# Patient Record
Sex: FEMALE | Race: WHITE | NOT HISPANIC OR LATINO | Employment: UNEMPLOYED | URBAN - METROPOLITAN AREA
[De-identification: names, ages, dates, MRNs, and addresses within clinical notes are randomized per-mention and may not be internally consistent; named-entity substitution may affect disease eponyms.]

---

## 2020-05-29 PROBLEM — S51.819A LACERATION OF FOREARM: Status: ACTIVE | Noted: 2020-05-29

## 2020-05-29 PROBLEM — Z72.0 TOBACCO ABUSE: Status: ACTIVE | Noted: 2020-05-29

## 2020-05-29 PROBLEM — Z87.440 HX: UTI (URINARY TRACT INFECTION): Status: ACTIVE | Noted: 2020-05-29

## 2020-05-29 PROBLEM — F41.1 GENERALIZED ANXIETY DISORDER: Status: ACTIVE | Noted: 2020-05-29

## 2020-09-24 PROBLEM — H69.90 DYSFUNCTION OF EUSTACHIAN TUBE: Status: ACTIVE | Noted: 2020-09-24

## 2020-09-24 PROBLEM — T78.40XA ALLERGIES: Status: ACTIVE | Noted: 2020-09-24

## 2021-04-12 PROBLEM — E78.00 PURE HYPERCHOLESTEROLEMIA: Status: ACTIVE | Noted: 2021-04-12

## 2021-07-26 PROBLEM — R00.1 SYMPTOMATIC BRADYCARDIA: Status: ACTIVE | Noted: 2021-07-26

## 2021-07-26 PROBLEM — Z00.00 PREVENTATIVE HEALTH CARE: Status: ACTIVE | Noted: 2021-07-26

## 2021-07-26 PROBLEM — H04.123 BILATERAL DRY EYES: Status: ACTIVE | Noted: 2021-07-26

## 2021-07-26 PROBLEM — R23.2 HOT FLASHES: Status: ACTIVE | Noted: 2021-07-26

## 2021-07-26 PROBLEM — Z82.49 FAMILY HISTORY OF EARLY CAD: Status: ACTIVE | Noted: 2021-07-26

## 2021-07-26 PROBLEM — R00.2 PALPITATIONS: Status: ACTIVE | Noted: 2021-07-26

## 2021-07-26 PROBLEM — R68.2 DRY MOUTH: Status: ACTIVE | Noted: 2021-07-26

## 2021-10-25 PROBLEM — Z00.00 PREVENTATIVE HEALTH CARE: Status: RESOLVED | Noted: 2021-07-26 | Resolved: 2021-10-25

## 2022-11-28 PROBLEM — Z98.51 HISTORY OF BILATERAL TUBAL LIGATION: Status: ACTIVE | Noted: 2022-11-28

## 2022-11-28 PROBLEM — S51.819A LACERATION OF FOREARM: Status: RESOLVED | Noted: 2020-05-29 | Resolved: 2022-11-28

## 2022-11-28 PROBLEM — Z86.19 HISTORY OF TRICHOMONIASIS: Status: ACTIVE | Noted: 2022-11-28

## 2022-11-28 PROBLEM — N94.10 DYSPAREUNIA, FEMALE: Status: ACTIVE | Noted: 2022-11-28

## 2022-11-28 PROBLEM — Z87.440 HX: UTI (URINARY TRACT INFECTION): Status: RESOLVED | Noted: 2020-05-29 | Resolved: 2022-11-28

## 2022-11-28 PROBLEM — N93.0 POSTCOITAL BLEEDING: Status: ACTIVE | Noted: 2022-11-28

## 2022-11-28 PROBLEM — Z98.890 HISTORY OF LOOP ELECTRICAL EXCISION PROCEDURE (LEEP): Status: ACTIVE | Noted: 2022-11-28

## 2022-11-28 PROBLEM — N92.3 INTERMENSTRUAL BLEEDING: Status: ACTIVE | Noted: 2022-11-28

## 2022-11-28 PROBLEM — N64.4 BREAST TENDERNESS: Status: ACTIVE | Noted: 2022-11-28

## 2023-10-30 PROBLEM — J31.0 NONALLERGIC RHINITIS: Status: ACTIVE | Noted: 2023-10-30

## 2023-10-30 PROBLEM — R44.8 PARESTHESIA OF TONGUE: Status: ACTIVE | Noted: 2023-10-30

## 2023-10-30 PROBLEM — L50.1 CHRONIC IDIOPATHIC URTICARIA: Status: ACTIVE | Noted: 2023-10-30

## 2024-01-10 PROBLEM — F41.9 ANXIETY: Status: ACTIVE | Noted: 2024-01-10

## 2024-01-10 PROBLEM — F60.9 PERSONALITY DISORDER: Status: ACTIVE | Noted: 2024-01-10

## 2024-01-11 ENCOUNTER — SOCIAL WORK (OUTPATIENT)
Dept: ADMINISTRATIVE | Facility: OTHER | Age: 39
End: 2024-01-11

## 2024-01-11 NOTE — PROGRESS NOTES
Sw received a consult to assist with counseling services. Sw called Patient ([phone]). No one answered and voice mail was full. Alex will try calling at another time.    Mitzi Navarro LCSW    [phone]

## 2024-01-12 ENCOUNTER — SOCIAL WORK (OUTPATIENT)
Dept: ADMINISTRATIVE | Facility: OTHER | Age: 39
End: 2024-01-12

## 2024-01-12 NOTE — PROGRESS NOTES
Sw received a consult to assist with counseling services. Sw called Patient ([phone]). No one answered and voice mail was full. Alex will try calling at another time.    Mitzi Navarro LCSW    [phone]

## 2024-01-17 ENCOUNTER — SOCIAL WORK (OUTPATIENT)
Dept: ADMINISTRATIVE | Facility: OTHER | Age: 39
End: 2024-01-17

## 2024-01-17 NOTE — PROGRESS NOTES
Sw received a consult to assist with counseling. Sw called Patient ([phone]). No one answered and voice mail was full. Alex unable to speak to Patient regarding counseling services.    Mitzi Navarro LCSW    [phone]

## 2024-03-03 ENCOUNTER — PATIENT MESSAGE (OUTPATIENT)
Dept: ADMINISTRATIVE | Facility: HOSPITAL | Age: 39
End: 2024-03-03

## 2024-03-18 ENCOUNTER — TELEPHONE (OUTPATIENT)
Dept: ADMINISTRATIVE | Facility: HOSPITAL | Age: 39
End: 2024-03-18

## 2024-04-23 ENCOUNTER — PATIENT OUTREACH (OUTPATIENT)
Dept: ADMINISTRATIVE | Facility: HOSPITAL | Age: 39
End: 2024-04-23

## 2024-04-23 ENCOUNTER — TELEPHONE (OUTPATIENT)
Dept: ADMINISTRATIVE | Facility: HOSPITAL | Age: 39
End: 2024-04-23